# Patient Record
Sex: FEMALE | Race: WHITE
[De-identification: names, ages, dates, MRNs, and addresses within clinical notes are randomized per-mention and may not be internally consistent; named-entity substitution may affect disease eponyms.]

---

## 2019-01-31 ENCOUNTER — HOSPITAL ENCOUNTER (EMERGENCY)
Dept: HOSPITAL 54 - ER | Age: 84
Discharge: HOME | End: 2019-01-31
Payer: COMMERCIAL

## 2019-01-31 VITALS — WEIGHT: 130 LBS | BODY MASS INDEX: 23.04 KG/M2 | HEIGHT: 63 IN

## 2019-01-31 VITALS — DIASTOLIC BLOOD PRESSURE: 93 MMHG | SYSTOLIC BLOOD PRESSURE: 149 MMHG

## 2019-01-31 DIAGNOSIS — M54.16: Primary | ICD-10-CM

## 2019-01-31 DIAGNOSIS — Z88.2: ICD-10-CM

## 2019-01-31 DIAGNOSIS — M16.12: ICD-10-CM

## 2019-01-31 DIAGNOSIS — Z95.5: ICD-10-CM

## 2019-01-31 DIAGNOSIS — M51.36: ICD-10-CM

## 2019-01-31 LAB
PH UR STRIP: 7.5 [PH] (ref 5–8)
UROBILINOGEN UR STRIP-MCNC: 0.2 EU/DL

## 2019-01-31 PROCEDURE — 81001 URINALYSIS AUTO W/SCOPE: CPT

## 2019-01-31 PROCEDURE — 99284 EMERGENCY DEPT VISIT MOD MDM: CPT

## 2019-01-31 PROCEDURE — 72110 X-RAY EXAM L-2 SPINE 4/>VWS: CPT

## 2019-01-31 PROCEDURE — 96372 THER/PROPH/DIAG INJ SC/IM: CPT

## 2019-01-31 PROCEDURE — 73503 X-RAY EXAM HIP UNI 4/> VIEWS: CPT

## 2019-12-02 ENCOUNTER — HOSPITAL ENCOUNTER (EMERGENCY)
Dept: HOSPITAL 54 - ER | Age: 84
Discharge: HOME | End: 2019-12-02
Payer: COMMERCIAL

## 2019-12-02 VITALS — HEIGHT: 64 IN | WEIGHT: 130 LBS | BODY MASS INDEX: 22.2 KG/M2

## 2019-12-02 VITALS — SYSTOLIC BLOOD PRESSURE: 167 MMHG | DIASTOLIC BLOOD PRESSURE: 84 MMHG

## 2019-12-02 DIAGNOSIS — F41.9: Primary | ICD-10-CM

## 2019-12-02 DIAGNOSIS — R42: ICD-10-CM

## 2019-12-02 DIAGNOSIS — Z88.2: ICD-10-CM

## 2019-12-02 DIAGNOSIS — Z98.890: ICD-10-CM

## 2022-02-10 ENCOUNTER — HOSPITAL ENCOUNTER (INPATIENT)
Dept: HOSPITAL 54 - ER | Age: 87
LOS: 1 days | Discharge: HOME | DRG: 202 | End: 2022-02-11
Attending: NURSE PRACTITIONER | Admitting: INTERNAL MEDICINE
Payer: COMMERCIAL

## 2022-02-10 VITALS — BODY MASS INDEX: 20.38 KG/M2 | HEIGHT: 63 IN | WEIGHT: 115 LBS

## 2022-02-10 DIAGNOSIS — I25.10: ICD-10-CM

## 2022-02-10 DIAGNOSIS — Z79.01: ICD-10-CM

## 2022-02-10 DIAGNOSIS — Z66: ICD-10-CM

## 2022-02-10 DIAGNOSIS — D64.9: ICD-10-CM

## 2022-02-10 DIAGNOSIS — J90: ICD-10-CM

## 2022-02-10 DIAGNOSIS — Z20.822: ICD-10-CM

## 2022-02-10 DIAGNOSIS — I48.0: ICD-10-CM

## 2022-02-10 DIAGNOSIS — I50.9: ICD-10-CM

## 2022-02-10 DIAGNOSIS — I11.0: ICD-10-CM

## 2022-02-10 DIAGNOSIS — J45.901: Primary | ICD-10-CM

## 2022-02-10 DIAGNOSIS — Z95.2: ICD-10-CM

## 2022-02-10 DIAGNOSIS — J98.11: ICD-10-CM

## 2022-02-10 DIAGNOSIS — Z95.1: ICD-10-CM

## 2022-02-10 LAB
ALBUMIN SERPL BCP-MCNC: 3 G/DL (ref 3.4–5)
ALP SERPL-CCNC: 129 U/L (ref 46–116)
ALT SERPL W P-5'-P-CCNC: 54 U/L (ref 12–78)
AST SERPL W P-5'-P-CCNC: 36 U/L (ref 15–37)
BASOPHILS # BLD AUTO: 0.1 K/UL (ref 0–0.2)
BASOPHILS NFR BLD AUTO: 0.9 % (ref 0–2)
BILIRUB DIRECT SERPL-MCNC: 0.1 MG/DL (ref 0–0.2)
BILIRUB SERPL-MCNC: 0.6 MG/DL (ref 0.2–1)
BUN SERPL-MCNC: 21 MG/DL (ref 7–18)
CALCIUM SERPL-MCNC: 8.2 MG/DL (ref 8.5–10.1)
CHLORIDE SERPL-SCNC: 105 MMOL/L (ref 98–107)
CO2 SERPL-SCNC: 29 MMOL/L (ref 21–32)
CREAT SERPL-MCNC: 0.5 MG/DL (ref 0.6–1.3)
EOSINOPHIL NFR BLD AUTO: 4.5 % (ref 0–6)
GLUCOSE SERPL-MCNC: 90 MG/DL (ref 74–106)
HCT VFR BLD AUTO: 34 % (ref 33–45)
HGB BLD-MCNC: 11 G/DL (ref 11.5–14.8)
LYMPHOCYTES NFR BLD AUTO: 1.3 K/UL (ref 0.8–4.8)
LYMPHOCYTES NFR BLD AUTO: 15.5 % (ref 20–44)
MCHC RBC AUTO-ENTMCNC: 33 G/DL (ref 31–36)
MCV RBC AUTO: 92 FL (ref 82–100)
MONOCYTES NFR BLD AUTO: 0.5 K/UL (ref 0.1–1.3)
MONOCYTES NFR BLD AUTO: 5.7 % (ref 2–12)
NEUTROPHILS # BLD AUTO: 6.2 K/UL (ref 1.8–8.9)
NEUTROPHILS NFR BLD AUTO: 73.4 % (ref 43–81)
PLATELET # BLD AUTO: 274 K/UL (ref 150–450)
POTASSIUM SERPL-SCNC: 4.1 MMOL/L (ref 3.5–5.1)
PROT SERPL-MCNC: 6.6 G/DL (ref 6.4–8.2)
RBC # BLD AUTO: 3.64 MIL/UL (ref 4–5.2)
SODIUM SERPL-SCNC: 140 MMOL/L (ref 136–145)
WBC NRBC COR # BLD AUTO: 8.4 K/UL (ref 4.3–11)

## 2022-02-10 PROCEDURE — C9803 HOPD COVID-19 SPEC COLLECT: HCPCS

## 2022-02-10 PROCEDURE — U0003 INFECTIOUS AGENT DETECTION BY NUCLEIC ACID (DNA OR RNA); SEVERE ACUTE RESPIRATORY SYNDROME CORONAVIRUS 2 (SARS-COV-2) (CORONAVIRUS DISEASE [COVID-19]), AMPLIFIED PROBE TECHNIQUE, MAKING USE OF HIGH THROUGHPUT TECHNOLOGIES AS DESCRIBED BY CMS-2020-01-R: HCPCS

## 2022-02-10 PROCEDURE — G0378 HOSPITAL OBSERVATION PER HR: HCPCS

## 2022-02-10 NOTE — NUR
PT CAME TO ER C/O SOB X 2 DAYS. NC REPORTS GET ALLERGIES WHEN WINDS BLOW 
STRONGLY. HX OF ALLERGY AND ASTHMA. DENIES HAVING ASTHMA ATTACK FORO 10 YEARS. 
AAOX4, NOT IN RESP DISTRESS. BREATHING EVEN AND UNLABORED. ON MONITOR. POX 95% 
ON RA. WILL CONTINUE TO MONITOR.

## 2022-02-10 NOTE — NUR
RN NOTES





ADMITTED A 85 Y/O FEMALE PATIENT FROM ER VIA GURNEY. PATIENT A/O X4 ABLE TO MAKE NEEDS 
KNOWN. WITH OXYGEN INHALATION AT 3LPM VIA NC SATING 97%. WITH IV ACCESS AT RFA # 20 PATENT 
FLUSHES WELL. VITAL SIGNS TAKEN AND RECORDED AFEBRILE. SAFELY TRANSFER TO BED. COMPLETE BODY 
ASSESSMENT DONE. BELONGING RECORDED. ALL SAFETY MEASURES IN PLACE AT ALL TIMES. CALL LIGHT 
WITHIN REACH HOB ELEVATED. BED ON LOWEST POSITION AND LOCKED. WILL MONITOR THE PATIENT 
CLOSELY.

## 2022-02-11 VITALS — DIASTOLIC BLOOD PRESSURE: 69 MMHG | SYSTOLIC BLOOD PRESSURE: 132 MMHG

## 2022-02-11 VITALS — SYSTOLIC BLOOD PRESSURE: 132 MMHG | DIASTOLIC BLOOD PRESSURE: 69 MMHG

## 2022-02-11 VITALS — SYSTOLIC BLOOD PRESSURE: 141 MMHG | DIASTOLIC BLOOD PRESSURE: 84 MMHG

## 2022-02-11 VITALS — DIASTOLIC BLOOD PRESSURE: 74 MMHG | SYSTOLIC BLOOD PRESSURE: 127 MMHG

## 2022-02-11 LAB
ALBUMIN SERPL BCP-MCNC: 2.6 G/DL (ref 3.4–5)
ALP SERPL-CCNC: 108 U/L (ref 46–116)
ALT SERPL W P-5'-P-CCNC: 47 U/L (ref 12–78)
AST SERPL W P-5'-P-CCNC: 23 U/L (ref 15–37)
BASOPHILS # BLD AUTO: 0 K/UL (ref 0–0.2)
BASOPHILS NFR BLD AUTO: 0.2 % (ref 0–2)
BILIRUB SERPL-MCNC: 0.3 MG/DL (ref 0.2–1)
BUN SERPL-MCNC: 22 MG/DL (ref 7–18)
CALCIUM SERPL-MCNC: 8.7 MG/DL (ref 8.5–10.1)
CHLORIDE SERPL-SCNC: 105 MMOL/L (ref 98–107)
CO2 SERPL-SCNC: 30 MMOL/L (ref 21–32)
CREAT SERPL-MCNC: 0.6 MG/DL (ref 0.6–1.3)
EOSINOPHIL NFR BLD AUTO: 0.1 % (ref 0–6)
GLUCOSE SERPL-MCNC: 121 MG/DL (ref 74–106)
HCT VFR BLD AUTO: 31 % (ref 33–45)
HGB BLD-MCNC: 10.3 G/DL (ref 11.5–14.8)
LYMPHOCYTES NFR BLD AUTO: 0.9 K/UL (ref 0.8–4.8)
LYMPHOCYTES NFR BLD AUTO: 13.6 % (ref 20–44)
MAGNESIUM SERPL-MCNC: 2.1 MG/DL (ref 1.8–2.4)
MCHC RBC AUTO-ENTMCNC: 34 G/DL (ref 31–36)
MCV RBC AUTO: 91 FL (ref 82–100)
MONOCYTES NFR BLD AUTO: 0.4 K/UL (ref 0.1–1.3)
MONOCYTES NFR BLD AUTO: 6.3 % (ref 2–12)
NEUTROPHILS # BLD AUTO: 5.4 K/UL (ref 1.8–8.9)
NEUTROPHILS NFR BLD AUTO: 79.8 % (ref 43–81)
PHOSPHATE SERPL-MCNC: 3.5 MG/DL (ref 2.5–4.9)
PLATELET # BLD AUTO: 260 K/UL (ref 150–450)
POTASSIUM SERPL-SCNC: 3.5 MMOL/L (ref 3.5–5.1)
PROT SERPL-MCNC: 6 G/DL (ref 6.4–8.2)
RBC # BLD AUTO: 3.39 MIL/UL (ref 4–5.2)
SODIUM SERPL-SCNC: 141 MMOL/L (ref 136–145)
WBC NRBC COR # BLD AUTO: 6.7 K/UL (ref 4.3–11)

## 2022-02-11 NOTE — NUR
RN TELE OPENING NOTES



RECEIVED PATIENT IN BED, A/O X4 ABLE TO MAKE NEEDS KNOWN. WITH OXYGEN SUPPLEMENTATION AT 
3LPM VIA NC SATING 97%. WITH IV ACCESS AT RFA # 20 PATENT FLUSHES WELL. VITAL SIGNS TAKEN 
AND RECORDED AFEBRILE. NO S/S OF CHEST DISCOMFORT OR ANY DISTRESS. NO SOB NOTED, BREATHING 
EXPANSION SYMMETRICAL. ALL SAFETY MEASURES IN PLACE AT ALL TIMES. CALL LIGHT WITHIN REACH 
HOB ELEVATED. BED ON LOWEST POSITION AND LOCKED. WILL MONITOR THE PATIENT CLOSELY.

## 2022-02-11 NOTE — NUR
RN NOTES





PATIENT REMAINS STABLE THE WHOLE SHIFT NO SIGNIFICANT CHANGES IN HEALTH CONDITION. ALL DUE 
MEDS GIVEN AS ORDERED. STILL AFIB CONTROLL AND UNCONTROLLED WITH PVC'S. PATIENT DENIES CHEST 
PAIN OR DISCOMFORT. ON OXYGEN INHALATION AT 3 LPM SATING 99%. PATIENT IS AMBULATORY USED 
BATHROOM. ALL SAFETY MEASURES IN PLACE AT ALL TIMES. HOB ELEVATED. CALL LIGHT WITHIN REACH. 
WILL ENDORSED TO MORNING RN FOR FREYA